# Patient Record
Sex: MALE | Race: OTHER | HISPANIC OR LATINO | URBAN - METROPOLITAN AREA
[De-identification: names, ages, dates, MRNs, and addresses within clinical notes are randomized per-mention and may not be internally consistent; named-entity substitution may affect disease eponyms.]

---

## 2019-02-26 ENCOUNTER — EMERGENCY (EMERGENCY)
Facility: HOSPITAL | Age: 59
LOS: 1 days | Discharge: HOME | End: 2019-02-26
Attending: STUDENT IN AN ORGANIZED HEALTH CARE EDUCATION/TRAINING PROGRAM
Payer: COMMERCIAL

## 2019-02-26 VITALS
WEIGHT: 179.9 LBS | HEIGHT: 65 IN | OXYGEN SATURATION: 99 % | DIASTOLIC BLOOD PRESSURE: 90 MMHG | HEART RATE: 88 BPM | SYSTOLIC BLOOD PRESSURE: 160 MMHG | RESPIRATION RATE: 18 BRPM | TEMPERATURE: 98 F

## 2019-02-26 PROCEDURE — 99284 EMERGENCY DEPT VISIT MOD MDM: CPT

## 2019-02-26 PROCEDURE — 70450 CT HEAD/BRAIN W/O DYE: CPT | Mod: 26

## 2019-02-26 PROCEDURE — 71045 X-RAY EXAM CHEST 1 VIEW: CPT | Mod: 26

## 2019-02-26 PROCEDURE — 70450 CT HEAD/BRAIN W/O DYE: CPT

## 2019-02-26 PROCEDURE — 71045 X-RAY EXAM CHEST 1 VIEW: CPT

## 2019-02-26 RX ORDER — IBUPROFEN 200 MG
1 TABLET ORAL
Qty: 12 | Refills: 0 | OUTPATIENT
Start: 2019-02-26 | End: 2019-02-28

## 2019-02-26 RX ORDER — METHOCARBAMOL 500 MG/1
1500 TABLET, FILM COATED ORAL ONCE
Qty: 0 | Refills: 0 | Status: COMPLETED | OUTPATIENT
Start: 2019-02-26 | End: 2019-02-26

## 2019-02-26 RX ORDER — IBUPROFEN 200 MG
600 TABLET ORAL ONCE
Qty: 0 | Refills: 0 | Status: COMPLETED | OUTPATIENT
Start: 2019-02-26 | End: 2019-02-26

## 2019-02-26 RX ORDER — METHOCARBAMOL 500 MG/1
1 TABLET, FILM COATED ORAL
Qty: 9 | Refills: 0 | OUTPATIENT
Start: 2019-02-26 | End: 2019-02-28

## 2019-02-26 RX ADMIN — Medication 600 MILLIGRAM(S): at 20:02

## 2019-02-26 RX ADMIN — Medication 600 MILLIGRAM(S): at 20:08

## 2019-02-26 RX ADMIN — METHOCARBAMOL 1500 MILLIGRAM(S): 500 TABLET, FILM COATED ORAL at 20:02

## 2019-02-26 NOTE — ED STATDOCS - PROGRESS NOTE DETAILS
57 y/o M with PMHx of CVA and HTN presents to the ED c/o left sided weakness, onset 3-4 days. Pt states, left arm numbness and unable to lift past a 90 degree angle without pain. Reports past stroke that left right side numb.

## 2019-02-26 NOTE — ED ADULT NURSE NOTE - OBJECTIVE STATEMENT
Pt states he hurt his arm while lifting a heavy object and was told to come to the ED for pain meds. He has not tried any other over the counter meds at home and has not been able to make an appt with a primary care provider.

## 2019-02-26 NOTE — ED ADULT NURSE REASSESSMENT NOTE - NS ED NURSE REASSESS COMMENT FT1
Pt. refusing blood work, states he only wants his pain medication. Pt awaiting MD consult at this time.

## 2019-02-26 NOTE — ED ADULT NURSE NOTE - CHPI ED NUR SYMPTOMS NEG
no deformity/no difficulty bearing weight/no weakness/no back pain/no fever/no tingling/no bruising/no numbness

## 2019-02-26 NOTE — ED PROVIDER NOTE - MUSCULOSKELETAL, MLM
Spine appears normal, tenderness to left scapular region and left lateral shoulder.  Remainder of LUE appears normal with no localized tenderness.  FROM all extremities x4.  5/5 strength noted to LUE, 3/5 strength to RUE (baseline as per pt secondary to past CVA)

## 2019-02-26 NOTE — ED PROVIDER NOTE - CHIEF COMPLAINT
The patient is a 58y Male complaining of weakness. The patient is a 58y Male complaining of left arm pain

## 2019-02-26 NOTE — ED PROVIDER NOTE - OBJECTIVE STATEMENT
57 y/o M, with hx of HTN, DM, HLD, and CVA with residual right sided weakness, presents to the ED c/o improving LUE pain, onset 2 days ago.  Pt states that he was lifting a heavy grocery bag.  Notes pain onset shortly after lifting.  Pain radiates from his shoulder down to his left hand.  Pain worse with movement.  Notes that pain has improved over the past few days.  Also states that he has residual right sided weakness from past CVA approximately 20 years ago.  Denies increased weakness to either side. 59 y/o M, with hx of HTN, DM, HLD, and CVA with residual right sided weakness, presents to the ED c/o improving left shoulder pain, onset 2 days ago.  Pt states that he was lifting a heavy grocery bag.  Notes pain onset shortly after lifting.  Pain radiates from his shoulder down to his left hand.  Pain worse with movement.  Notes that pain has improved over the past few days.  Also states that he has residual right sided weakness from past CVA approximately 20 years ago.  Denies increased weakness to either side.  Denies fever, chills, chest pain, SOB, cough, N/V/D, abd pain, neck pain, back pain, numbness, tingling, or HA.

## 2019-02-26 NOTE — ED ADULT TRIAGE NOTE - CHIEF COMPLAINT QUOTE
Patient arrived to ED today with c/o warmth and weakness to the left side of his body for the past 3-4 days.  Patient has right sided weakness after previous stroke and expressive aphagia.

## 2019-02-26 NOTE — ED PROVIDER NOTE - PMH
CVA (cerebral infarction)  R sided weakness  DM (diabetes mellitus)    HLD (hyperlipidemia)    HTN (hypertension)    Memory impairment  since his CVA

## 2020-10-30 NOTE — ED PROVIDER NOTE - DISCHARGE DATE
Called [employer] and notified her I already faxed the form over to them.  Thank you.  JOE Patton     
Calling again to check on status of forms. Please return call  
[facility] phone call message- general phone call:    Reason for call: they fax over a request for wound supplies that need to be signed by a doctor.    Action desired: just a fyi    Return call needed: Yes    OK to leave a message on voice mail? Yes    Advised patient to response may take up to 2 business days: Yes    Primary language: English      needed? No    Call taken on October 28, 2020 at 12:39 PM by Latricia Mercado  
Dr. Mack, this is just FYI for us.  Thank you.  JOE Patton    
26-Feb-2019

## 2021-10-30 ENCOUNTER — EMERGENCY (EMERGENCY)
Facility: HOSPITAL | Age: 61
LOS: 1 days | Discharge: HOME | End: 2021-10-30
Attending: EMERGENCY MEDICINE
Payer: COMMERCIAL

## 2021-10-30 VITALS
RESPIRATION RATE: 18 BRPM | OXYGEN SATURATION: 95 % | WEIGHT: 179.9 LBS | DIASTOLIC BLOOD PRESSURE: 89 MMHG | SYSTOLIC BLOOD PRESSURE: 155 MMHG | TEMPERATURE: 99 F | HEART RATE: 84 BPM | HEIGHT: 65 IN

## 2021-10-30 PROBLEM — E78.5 HYPERLIPIDEMIA, UNSPECIFIED: Chronic | Status: ACTIVE | Noted: 2019-02-28

## 2021-10-30 PROBLEM — E11.9 TYPE 2 DIABETES MELLITUS WITHOUT COMPLICATIONS: Chronic | Status: ACTIVE | Noted: 2019-02-28

## 2021-10-30 PROCEDURE — 99283 EMERGENCY DEPT VISIT LOW MDM: CPT

## 2021-10-30 RX ORDER — SODIUM CHLORIDE 0.65 %
1 AEROSOL, SPRAY (ML) NASAL ONCE
Refills: 0 | Status: COMPLETED | OUTPATIENT
Start: 2021-10-30 | End: 2021-10-30

## 2021-10-30 RX ORDER — SODIUM CHLORIDE 0.65 %
1 AEROSOL, SPRAY (ML) NASAL
Qty: 1 | Refills: 0
Start: 2021-10-30 | End: 2021-11-12

## 2021-10-30 RX ADMIN — Medication 1 SPRAY(S): at 14:22

## 2021-10-30 NOTE — ED STATDOCS - CLINICAL SUMMARY MEDICAL DECISION MAKING FREE TEXT BOX
AVSS, PE unremarkable; ENT follow up recommended for reassessment. Patient is aware of signs/symptoms to return to the emergency department.

## 2021-10-30 NOTE — ED STATDOCS - OBJECTIVE STATEMENT
61 M HTN HLD HM and CVA with R sided weakness presents to ed c/o 3 days of intermittent L sided epistaxis. No bleeding since last night. Denies trauma. Is not sure if he takes blood thinners. 61 year old M HTN HLD HM and CVA with R sided weakness presents to ed c/o 3 days of intermittent L sided epistaxis. No bleeding since last night. Denies trauma. Is not sure if he takes blood thinners. 61 year old M HTN HLD HM and CVA with R sided weakness presents to ed c/o 3 days of intermittent L sided epistaxis. No bleeding since last night. Denies trauma. Is not sure if he takes blood thinners. Denies any other complaints including ha, dizz, nv.

## 2021-10-30 NOTE — ED STATDOCS - CARE PROVIDER_API CALL
Michela Ceron)  Otolaryngology  [address] [place] [address], [place] [address]  Phone: [phone]  Fax: [phone]  Follow Up Time: Routine

## 2021-10-30 NOTE — ED STATDOCS - NSICDXPASTMEDICALHX_GEN_ALL_CORE_FT
PAST MEDICAL HISTORY:  CVA (cerebral infarction) R sided weakness    DM (diabetes mellitus)     HLD (hyperlipidemia)     HTN (hypertension)     Memory impairment since his CVA

## 2021-10-30 NOTE — ED ADULT NURSE NOTE - OBJECTIVE STATEMENT
Patient A&O x 3, presenting to the ED complaints of epistaxis x 3 days. Patient denied pain, headache, dizziness, or trauma to the nose. No distress observed, gait steady.

## 2021-10-30 NOTE — ED STATDOCS - MUSCULOSKELETAL, MLM
* No procedures listed *. CSE Anesthesia Post Evaluation Multimodal analgesia: multimodal analgesia used between 6 hours prior to anesthesia start to PACU discharge Patient location during evaluation: bedside Patient participation: complete - patient participated Level of consciousness: awake and alert Pain score: 3 Pain management: adequate Airway patency: patent Anesthetic complications: no 
Cardiovascular status: acceptable and hemodynamically stable Respiratory status: acceptable Hydration status: acceptable Post anesthesia nausea and vomiting:  none Vitals Value Taken Time BP 97/58 3/27/2019  3:46 PM  
Temp Pulse 64 3/27/2019  3:46 PM  
Resp SpO2 Vitals shown include unvalidated device data.
range of motion is not limited and there is no muscle tenderness.

## 2021-10-30 NOTE — ED STATDOCS - PROGRESS NOTE DETAILS
Taught pt proper way to stop nose bleeding.  Has f/u with PCP on 11/6  Given ENT referral if problem becomes recurrent  Given nasal saline spray with instructions

## 2021-10-30 NOTE — ED STATDOCS - PATIENT PORTAL LINK FT
You can access the FollowMyHealth Patient Portal offered by [facility] by registering at the following website: [email or web address]. By joining [employer]’s FollowMyHealth portal, you will also be able to view your health information using other applications (apps) compatible with our system.

## 2022-10-08 ENCOUNTER — EMERGENCY (EMERGENCY)
Facility: HOSPITAL | Age: 62
LOS: 1 days | Discharge: HOME | End: 2022-10-08
Attending: STUDENT IN AN ORGANIZED HEALTH CARE EDUCATION/TRAINING PROGRAM
Payer: COMMERCIAL

## 2022-10-08 VITALS
WEIGHT: 210.1 LBS | DIASTOLIC BLOOD PRESSURE: 85 MMHG | TEMPERATURE: 98 F | RESPIRATION RATE: 18 BRPM | OXYGEN SATURATION: 96 % | SYSTOLIC BLOOD PRESSURE: 176 MMHG | HEIGHT: 65 IN | HEART RATE: 61 BPM

## 2022-10-08 PROCEDURE — 99283 EMERGENCY DEPT VISIT LOW MDM: CPT

## 2022-10-08 RX ADMIN — Medication 1 APPLICATION(S): at 10:33

## 2022-10-08 NOTE — ED PROVIDER NOTE - CLINICAL SUMMARY MEDICAL DECISION MAKING FREE TEXT BOX
61yo M presenting with left hand rash x 2 weeks. dermatitis possible early psoriasis, will tx with topical steroids, recommend outpt derm f/u

## 2022-10-08 NOTE — ED PROVIDER NOTE - CARE PROVIDER_API CALL
Kortney Belcher)  Dermatology  [address], [address]  [place] [address]  Phone: [phone]  Fax: [phone]  Follow Up Time:

## 2022-10-08 NOTE — ED PROVIDER NOTE - ATTENDING APP SHARED VISIT CONTRIBUTION OF CARE
61yo M pmhx HTN, HLD, DM and CVA with residual R sided weakness presents to ED c/o pruritic rash to left hand x 2 weeks. States skin to dorsum of left hand over 2nd and 3rd knuckles has some scaling and gets very itchy. Not using any medications for symptoms. Denies new recent exposure, gardening, bites/scratches, joint swelling, new soaps/lotions.  AP - pruritic rash, no sign of infection. will treat supportively and outpt derm f/u

## 2022-10-08 NOTE — ED PROVIDER NOTE - NSFOLLOWUPINSTRUCTIONS_ED_ALL_ED_FT
- Return to the ED for any new or worsening symptoms.   - Apply thin layer triamcinolone to area 2x/day for 14 days  - Follow-up with dermatologist for further evaluation of rash    Rash    A rash is a change in the color of the skin. A rash can also change the way your skin feels. There are many different conditions and factors that can cause a rash, most of which are not dangerous. Make sure to follow up with your primary care physician or a dermatologist as instructed by your health care provider.    SEEK IMMEDIATE MEDICAL CARE IF YOU HAVE ANY OF THE FOLLOWING SYMPTOMS: fever, blisters, a rash inside your mouth, vaginal or anal pain, or altered mental status.     - Regrese al servicio de urgencias por cualquier síntoma nuevo o que empeore.  - Aplique renee capa delgada de triamcinolona en el área 2 veces al día lalo 14 días  - Seguimiento con un dermatólogo para renee evaluación adicional de la erupción    Sarpullido    Renee erupción es un cambio en el color de la piel. Renee erupción también puede cambiar la forma en que se siente healy piel. Hay muchas condiciones y factores diferentes que pueden causar renee erupción, la mayoría de los cuales no son peligrosos. Asegúrese de hacer un seguimiento con healy médico de atención primaria o un dermatólogo según las instrucciones de healy proveedor de atención médica.    BUSQUE ATENCIÓN MÉDICA INMEDIATA SI TIENE ALGUNO DE LOS SIGUIENTES SÍNTOMAS: fiebre, ampollas, sarpullido dentro de la boca, dolor vaginal o anal, o alteración del estado mental.

## 2022-10-08 NOTE — ED PROVIDER NOTE - PHYSICAL EXAMINATION
Gen: No acute distress, non toxic  HENT: NCAT, Mucous membranes moist, Oropharynx without exudates, uvula midline  Eyes: pink conjunctivae, EOMI, PERRL  Neuro: A&O x 3, residual R sided weakness 2/2 cva  MSK: No spine or joint tenderness to palpation, Full ROM all digits L hand  Skin: hyperkeratotic patches to dorsum of L hand worse over 2nd and 3rd mcp. no erythema or warmth

## 2022-10-08 NOTE — ED PROVIDER NOTE - OBJECTIVE STATEMENT
63yo M pmhx HTN, HLD, DM and CVA with residual R sided weakness presents to ED c/o pruritic rash to left hand x 2 weeks. States skin to dorsum of left hand over 2nd and 3rd knuckles has some scaling and gets very itchy. 63yo M pmhx HTN, HLD, DM and CVA with residual R sided weakness presents to ED c/o pruritic rash to left hand x 2 weeks. States skin to dorsum of left hand over 2nd and 3rd knuckles has some scaling and gets very itchy. Not using any medications for symptoms. Denies recent exposure, erythema, bites/scratches, joint swelling, trauma, new soaps/lotions.

## 2022-10-08 NOTE — ED PROVIDER NOTE - NS ED ROS FT
Gen: denies fever, chills, fatigue, weight loss  Skin: +RASH. Denies laceration, bruising  HEENT: denies visual changes, ear pain, nasal congestion, throat pain  Respiratory: denies BECERRA, SOB, cough, wheezing  Cardiovascular: denies chest pain, palpitations, diaphoresis, LE edema  MSK: denies joint swelling/pain, back pain, neck pain  Neuro: denies headache, dizziness, weakness, numbness

## 2022-10-08 NOTE — ED PROVIDER NOTE - PATIENT PORTAL LINK FT
You can access the FollowMyHealth Patient Portal offered by [facility] by registering at the following website: [email or web address]. By joining [employer]’s FollowMyHealth portal, you will also be able to view your health information using other applications (apps) compatible with our system.

## 2023-05-05 NOTE — ED ADULT NURSE NOTE - CAS ELECT INFOMATION PROVIDED
DC instructions

## 2023-06-09 ENCOUNTER — EMERGENCY (EMERGENCY)
Facility: HOSPITAL | Age: 63
LOS: 1 days | Discharge: HOME | End: 2023-06-09
Attending: EMERGENCY MEDICINE
Payer: COMMERCIAL

## 2023-06-09 VITALS
OXYGEN SATURATION: 96 % | SYSTOLIC BLOOD PRESSURE: 176 MMHG | RESPIRATION RATE: 16 BRPM | HEART RATE: 86 BPM | DIASTOLIC BLOOD PRESSURE: 95 MMHG | TEMPERATURE: 98 F

## 2023-06-09 PROCEDURE — 99283 EMERGENCY DEPT VISIT LOW MDM: CPT
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
## 2023-06-10 PROCEDURE — T1013: CPT

## 2023-06-10 PROCEDURE — 99282 EMERGENCY DEPT VISIT SF MDM: CPT

## 2023-06-10 NOTE — ED PROVIDER NOTE - CLINICAL SUMMARY MEDICAL DECISION MAKING FREE TEXT BOX
63yo M p/w epistaxis. bleeding controlled before arrival to ED. on initial eval, non-toxic appearing male found sitting in chair in no acute distress, dry blood noted in nares, otherwise remainder of PE WNL. discussed supportive care measures and return precautions. advised to f/u with PCP. discussed importance of BP control. pt verbalized understanding and agreement
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
## 2023-06-10 NOTE — ED PROVIDER NOTE - PATIENT PORTAL LINK FT
You can access the FollowMyHealth Patient Portal offered by [facility] by registering at the following website: [email or web address]. By joining [employer]’s FollowMyHealth portal, you will also be able to view your health information using other applications (apps) compatible with our system.

## 2023-06-10 NOTE — ED PROVIDER NOTE - PRINCIPAL DIAGNOSIS
Patient arrives with c/o abdominal pain that woke her up this morning at 5am. The pain is mostly upper and right side, rate 8/10 constant pain. She denies diarrhea but did vomit a few times. She reports this same pain was present a few days ago but then resolved after a few hours.    Bleeding nose

## 2023-06-10 NOTE — ED PROVIDER NOTE - PHYSICAL EXAMINATION
General: non-toxic appearing, in no acute distress, A+Ox3  HENT: normocephalic. Nasal mucosa non-inflamed, septum and turbinates normal. dry blood noted in nares. Mucous membranes moist, no gingival inflammation.   CV: RRR, nl s1/s2.  Resp: CTAB, normal rate and effort

## 2023-10-04 ENCOUNTER — OFFICE (OUTPATIENT)
Dept: URBAN - METROPOLITAN AREA CLINIC 94 | Facility: CLINIC | Age: 63
Setting detail: OPHTHALMOLOGY
End: 2023-10-04
Payer: COMMERCIAL

## 2023-10-04 DIAGNOSIS — H25.13: ICD-10-CM

## 2023-10-04 DIAGNOSIS — I63.50: ICD-10-CM

## 2023-10-04 PROCEDURE — 92250 FUNDUS PHOTOGRAPHY W/I&R: CPT | Performed by: OPHTHALMOLOGY

## 2023-10-04 PROCEDURE — 92083 EXTENDED VISUAL FIELD XM: CPT | Performed by: OPHTHALMOLOGY

## 2023-10-04 PROCEDURE — 92002 INTRM OPH EXAM NEW PATIENT: CPT | Performed by: OPHTHALMOLOGY

## 2023-10-04 ASSESSMENT — AXIALLENGTH_DERIVED
OS_AL: 22.3689
OD_AL: 22.5454

## 2023-10-04 ASSESSMENT — REFRACTION_AUTOREFRACTION
OD_CYLINDER: 0.00
OS_CYLINDER: -1.00
OD_AXIS: 000
OD_SPHERE: +1.75
OS_SPHERE: +2.75
OS_AXIS: 082

## 2023-10-04 ASSESSMENT — TONOMETRY
OS_IOP_MMHG: 16
OD_IOP_MMHG: 11

## 2023-10-04 ASSESSMENT — CONFRONTATIONAL VISUAL FIELD TEST (CVF)
OD_FINDINGS: FULL
OS_FINDINGS: FULL

## 2023-10-04 ASSESSMENT — KERATOMETRY
OS_K1POWER_DIOPTERS: 44.25
OS_K2POWER_DIOPTERS: 45.00
OD_K1POWER_DIOPTERS: 44.25
OD_AXISANGLE_DEGREES: 079
METHOD_AUTO_MANUAL: AUTO
OS_AXISANGLE_DEGREES: 083
OD_K2POWER_DIOPTERS: 45.00

## 2023-10-04 ASSESSMENT — SPHEQUIV_DERIVED
OS_SPHEQUIV: 2.25
OD_SPHEQUIV: 1.75

## 2023-10-04 ASSESSMENT — VISUAL ACUITY
OS_BCVA: 20/30-1
OD_BCVA: 20/30+1

## 2023-11-01 ENCOUNTER — APPOINTMENT (OUTPATIENT)
Dept: DERMATOLOGY | Facility: CLINIC | Age: 63
End: 2023-11-01

## 2024-01-12 ENCOUNTER — EMERGENCY (EMERGENCY)
Facility: HOSPITAL | Age: 64
LOS: 1 days | Discharge: HOME | End: 2024-01-12
Attending: EMERGENCY MEDICINE
Payer: COMMERCIAL

## 2024-01-12 VITALS
TEMPERATURE: 98 F | HEIGHT: 65 IN | WEIGHT: 179.9 LBS | RESPIRATION RATE: 18 BRPM | HEART RATE: 85 BPM | OXYGEN SATURATION: 97 % | DIASTOLIC BLOOD PRESSURE: 95 MMHG | SYSTOLIC BLOOD PRESSURE: 157 MMHG

## 2024-01-12 PROCEDURE — 99284 EMERGENCY DEPT VISIT MOD MDM: CPT

## 2024-01-12 PROCEDURE — 99283 EMERGENCY DEPT VISIT LOW MDM: CPT

## 2024-01-12 PROCEDURE — T1013: CPT

## 2024-01-12 RX ORDER — ACETAMINOPHEN 500 MG
975 TABLET ORAL ONCE
Refills: 0 | Status: COMPLETED | OUTPATIENT
Start: 2024-01-12 | End: 2024-01-12

## 2024-01-12 RX ORDER — GABAPENTIN 400 MG/1
300 CAPSULE ORAL ONCE
Refills: 0 | Status: COMPLETED | OUTPATIENT
Start: 2024-01-12 | End: 2024-01-12

## 2024-01-12 RX ORDER — GABAPENTIN 400 MG/1
1 CAPSULE ORAL
Qty: 15 | Refills: 0
Start: 2024-01-12 | End: 2024-01-16

## 2024-01-12 RX ADMIN — Medication 975 MILLIGRAM(S): at 16:31

## 2024-01-12 RX ADMIN — GABAPENTIN 300 MILLIGRAM(S): 400 CAPSULE ORAL at 16:30

## 2024-01-12 NOTE — ED PROVIDER NOTE - NSFOLLOWUPINSTRUCTIONS_ED_ALL_ED_FT
- Return to the ED for any new or worsening symptoms.   - Follow-up with podiatry for further evaluation if symptoms persist       - Regrese al servicio de urgencias ante cualquier síntoma nuevo o que empeore.  - Seguimiento con podólogo para evaluación adicional si los síntomas persisten.

## 2024-01-12 NOTE — ED PROVIDER NOTE - OBJECTIVE STATEMENT
62yo M  PMHx HTN, HLD, DM and CVA with residual R sided weakness presents to ED c/o atraumatic right leg pain onset this morning. Reporting pain to right foot that radiates proximally to right hip. Did not take pain medication prior to arrival. States pain is worse with movement and walking, feels pain when he steps on right foot. Denies fever, chills, calf pain/swelling, numbness, tingling, increased weakness, cp, sob, n/v/d, headache, dizziness, fall, trauma, bowel/bladder incontinence, back pain.   : Mayo Stanton

## 2024-01-12 NOTE — ED PROVIDER NOTE - CLINICAL SUMMARY MEDICAL DECISION MAKING FREE TEXT BOX
64yo M presenting with atraumatic RLE pain x 1 day, most prevalent to right foot. Exam unremarkable, no focal weakness. Neurovascularly intact. Range of motion at baseline. No fall/trauma. Suspect symptoms related to neuropathy, given dose of gabapentin and tylenol. advised to f/u with podiatry for further evaluation of symptoms. return precautions discussed 64yo M presenting with atraumatic RLE pain x 1 day, most prevalent to right foot. Exam unremarkable, no focal weakness. Neurovascularly intact. Range of motion at baseline. No fall/trauma. Suspect symptoms related to neuropathy, given dose of gabapentin and tylenol. pt feeling much better after medications, ambulating in ED without pain. advised to f/u with podiatry for further evaluation of symptoms. return precautions discussed

## 2024-01-12 NOTE — ED PROVIDER NOTE - ATTENDING APP SHARED VISIT CONTRIBUTION OF CARE
62yo M  PMHx HTN, HLD, DM and CVA with residual R sided weakness presents to ED c/o atraumatic right leg pain onset this morning. Reporting pain to right foot that radiates proximally to right hip. Did not take pain medication prior to arrival. States pain is worse with movement and walking, feels pain when he steps on right foot. Denies fever, chills, calf pain/swelling, numbness, tingling, increased weakness, cp, sob, n/v/d, headache, dizziness, fall, trauma, bowel/bladder incontinence, back pain.     On examination patient with tenderness noted to the toes but no signs of vascular insufficiency, C 2 seconds strong DP pulses.  Range of motion of all lower extremity joints without reduction of pain.  No signs of infection.  Will treat with gabapentin given patient's diabetes history, lotions to moisturize feet and toes and outpatient podiatry follow-up. 64yo M  PMHx HTN, HLD, DM and CVA with residual R sided weakness presents to ED c/o atraumatic right leg pain onset this morning. Reporting pain to right foot that radiates proximally to right hip. Did not take pain medication prior to arrival. States pain is worse with movement and walking, feels pain when he steps on right foot. Denies fever, chills, calf pain/swelling, numbness, tingling, increased weakness, cp, sob, n/v/d, headache, dizziness, fall, trauma, bowel/bladder incontinence, back pain.     On examination patient with tenderness noted to the toes but no signs of vascular insufficiency, C 2 seconds strong DP pulses.  Range of motion of all lower extremity joints without reduction of pain.  No signs of infection.  Will treat with gabapentin given patient's diabetes history, lotions to moisturize feet and toes and outpatient podiatry follow-up.

## 2024-01-12 NOTE — ED ADULT TRIAGE NOTE - CHIEF COMPLAINT QUOTE
pt c/o right leg pain when trying to get out of bed this morning. pt has h/o stroke in 2013 with residual right arm weakness, slurred speech and right facial droop.

## 2024-01-12 NOTE — ED PROVIDER NOTE - CARE PROVIDER_API CALL
Parminder Sanchez  Podiatric Medicine and Surgery  [address] [place] [id number]  Phone: [phone]  Fax: [phone]  Follow Up Time:    Parminder Sanchez  Podiatric Medicine and Surgery  [address] [place] [id number]  Phone: [phone]  Fax: [phone]  Follow Up Time:

## 2024-01-12 NOTE — ED PROVIDER NOTE - CARE PROVIDERS DIRECT ADDRESSES
[email or web address]

## 2024-01-12 NOTE — ED PROVIDER NOTE - PHYSICAL EXAMINATION
Gen: No acute distress, non toxic  Head: NCAT  Eyes: pink conjunctivae, EOMI, PERRL  CV: RRR, nl s1/s2.  Resp: CTAB, normal rate and effort  Neuro: A&O x 3, chronic R facial droop and RUE hemiparesis. sensorimotor intact RLE.   MSK: Full ROM LLE, ROM baseline RLE, able to perform hip flexion without pain. +TTP along toes and plantar aspect of foot.   Vasc: DP pulses intact, symmetric b/l  Skin: No rashes. intact and perfused. Dry skin to R foot

## 2024-01-12 NOTE — ED PROVIDER NOTE - PATIENT PORTAL LINK FT
You can access the FollowMyHealth Patient Portal offered by [facility] by registering at the following website: [email or web address]. By joining [email or web address]’s FollowMyHealth portal, you will also be able to view your health information using other applications (apps) compatible with our system. You can access the FollowMyHealth Patient Portal offered by [facility] by registering at the following website: [email or web address]. By joining [employer]’s FollowMyHealth portal, you will also be able to view your health information using other applications (apps) compatible with our system.

## 2024-01-23 NOTE — ED PROVIDER NOTE - MDM ORDERS SUBMITTED SELECTION
[Nutrition/ Exercise/ Weight Management] : nutrition, exercise, weight management [Vitamins/Supplements] : vitamins/supplements [Breast Self Exam] : breast self exam Medications

## 2024-06-19 NOTE — ED PROVIDER NOTE - CARDIAC, MLM
What Type Of Note Output Would You Prefer (Optional)?: Standard Output
How Severe Are Your Spot(S)?: mild
Have Your Spot(S) Been Treated In The Past?: has not been treated
Hpi Title: Evaluation of Skin Lesions
Normal rate, regular rhythm.  Heart sounds S1, S2.  No murmurs

## 2024-10-23 ENCOUNTER — EMERGENCY (EMERGENCY)
Facility: HOSPITAL | Age: 64
LOS: 1 days | Discharge: HOME | End: 2024-10-23
Attending: STUDENT IN AN ORGANIZED HEALTH CARE EDUCATION/TRAINING PROGRAM
Payer: COMMERCIAL

## 2024-10-23 VITALS
HEART RATE: 76 BPM | SYSTOLIC BLOOD PRESSURE: 143 MMHG | TEMPERATURE: 98 F | WEIGHT: 179.9 LBS | OXYGEN SATURATION: 98 % | HEIGHT: 65 IN | RESPIRATION RATE: 20 BRPM | DIASTOLIC BLOOD PRESSURE: 84 MMHG

## 2024-10-23 PROCEDURE — 96372 THER/PROPH/DIAG INJ SC/IM: CPT

## 2024-10-23 PROCEDURE — 99284 EMERGENCY DEPT VISIT MOD MDM: CPT

## 2024-10-23 PROCEDURE — 99283 EMERGENCY DEPT VISIT LOW MDM: CPT | Mod: 25

## 2024-10-23 RX ORDER — KETOROLAC TROMETHAMINE 10 MG/1
15 TABLET, FILM COATED ORAL ONCE
Refills: 0 | Status: DISCONTINUED | OUTPATIENT
Start: 2024-10-23 | End: 2024-10-23

## 2024-10-23 RX ORDER — CYCLOBENZAPRINE HCL 10 MG
10 TABLET ORAL ONCE
Refills: 0 | Status: COMPLETED | OUTPATIENT
Start: 2024-10-23 | End: 2024-10-23

## 2024-10-23 RX ORDER — LIDOCAINE 50 MG/G
1 CREAM TOPICAL ONCE
Refills: 0 | Status: COMPLETED | OUTPATIENT
Start: 2024-10-23 | End: 2024-10-23

## 2024-10-23 RX ORDER — CYCLOBENZAPRINE HCL 10 MG
1 TABLET ORAL
Qty: 12 | Refills: 0
Start: 2024-10-23 | End: 2024-10-26

## 2024-10-23 RX ADMIN — Medication 10 MILLIGRAM(S): at 10:51

## 2024-10-23 RX ADMIN — LIDOCAINE 1 PATCH: 50 CREAM TOPICAL at 10:51

## 2024-10-23 RX ADMIN — KETOROLAC TROMETHAMINE 15 MILLIGRAM(S): 10 TABLET, FILM COATED ORAL at 10:51

## 2024-10-23 NOTE — ED PROVIDER NOTE - NSFOLLOWUPINSTRUCTIONS_ED_ALL_ED_FT
You were seen and evaluated in the emergency department for your symptoms.  Your symptoms are likely due to a muscle spasm.  Please follow-up with your primary care doctor within 2 days to discuss your visit here today.    Take 500mg - 1000mg of Tylenol (acetaminophen) with food every 6 hours for 3 days to reduce inflammation and treat your pain.  After that, you may take as needed - please follow the directions on the packaging.  Standard regular strength Tylenol is 500mg, therefore take 1-2 pills per dose.     Please take the prescribed flexeril as needed for pain every 8 hours. Please do not drive or operate machinery while taking this medication as it can make you sleepy.     Please return to the emergency department for any new or concerning symptoms including but not limited to fever, chills, chest pain, difficulty breathing, worsening pain.

## 2024-10-23 NOTE — ED ADULT TRIAGE NOTE - CHIEF COMPLAINT QUOTE
BIBA from home c/o non traumatic   R sided body pain for three days progressively getting worse . Hx of R sided stroke with residual

## 2024-10-23 NOTE — ED PROVIDER NOTE - PHYSICAL EXAMINATION
General: non-toxic appearing, in no acute distress, A+Ox3  CV: RRR, nl s1/s2.  Resp: CTAB, normal rate and effort  : No CVAT.  Neuro: R lower ext 3/5 strength (baseline due to R sided residual weakness s/p cva)  MSK: No spine or joint tenderness to palpation. ambulatory with R sided limp  Skin: No rashes, lacerations, abrasions, ecchymosis. intact and perfused. General: non-toxic appearing, in no acute distress, A+Ox3  CV: RRR, nl s1/s2.  Resp: CTAB, normal rate and effort  : No CVAT.  Neuro: R lower ext 4/5 strength (baseline due to R sided residual weakness s/p cva), RUE held in baseline contracture.  MSK: No spine or joint tenderness to palpation. ambulatory with R sided limp  Skin: No rashes, lacerations, abrasions, ecchymosis. intact and perfused.

## 2024-10-23 NOTE — ED PROVIDER NOTE - PATIENT PORTAL LINK FT
You can access the FollowMyHealth Patient Portal offered by [facility] by registering at the following website: [email or web address]. By joining [employer]’s FollowMyHealth portal, you will also be able to view your health information using other applications (apps) compatible with our system.

## 2024-10-23 NOTE — ED PROVIDER NOTE - OBJECTIVE STATEMENT
65yo M pmh HTN, HLD, DM, CVA w residual R side deficits, presents to ED c/o upper R flank pain with radiation to R upper back and pain of R foot when takes a step x3d. pain worse with ambulating and certain positions. no relief with otc pain meds, unsure of name of meds, last took meds yesterday. denies recent trauma/injury, CP, SOB, palpitations, dyrusia, urinary freq/urgency, hematuria, abdominal pain, NV, numbness

## 2024-10-23 NOTE — ED PROVIDER NOTE - ATTENDING APP SHARED VISIT CONTRIBUTION OF CARE
I have personally performed a history and physical examination of the patient and discussed management with the RAFAEL as well as the patient.  I reviewed the RAFAEL's note and agree with the documented findings and plan of care.  I have authored and modified critical sections of the Provider Note, including but not limited to HPI, Physical Exam and MDM.    65yo M pmh HTN, HLD, DM, CVA w residual R side deficits, presents to ED c/o upper R flank pain with radiation to R upper back and pain of R foot when takes a step x3d. pain worse with ambulating and certain positions. no relief with otc pain meds, unsure of name of meds, last took meds yesterday.  Range of motion at baseline throughout, ambulating to baseline, and strength reported baseline.  No evidence of rash on examination.  No CVA tenderness to palpation.  Clinically apparent right paraspinal muscle spasm.  Possibly MSK related.  Cannot exclude diabetic foot ulcer.  Discussed this with patient and requested to examine foot by removing boot however patient refused multiple times stating boot is difficult to get back on.  Offered to re-don boot for the patient, continues to decline for examination.  Will provide symptomatic control as needed.  No current concern for acute on chronic intracranial etiology.  Disposition pending response to medications.  Likely outpatient follow-up.

## 2024-10-26 DIAGNOSIS — I69.351 HEMIPLEGIA AND HEMIPARESIS FOLLOWING CEREBRAL INFARCTION AFFECTING RIGHT DOMINANT SIDE: ICD-10-CM

## 2024-10-26 DIAGNOSIS — R10.9 UNSPECIFIED ABDOMINAL PAIN: ICD-10-CM

## 2024-10-26 DIAGNOSIS — M79.671 PAIN IN RIGHT FOOT: ICD-10-CM

## 2024-10-26 DIAGNOSIS — I10 ESSENTIAL (PRIMARY) HYPERTENSION: ICD-10-CM

## 2024-10-26 DIAGNOSIS — E11.9 TYPE 2 DIABETES MELLITUS WITHOUT COMPLICATIONS: ICD-10-CM

## 2024-12-03 NOTE — ED PROVIDER NOTE - IV ALTEPLASE INCLUSION HIDDEN
lotions for this condition.    He has a known allergy to SULFA DRUGS, which previously resulted in a severe hive outbreak when he was prescribed Bactrim at an urgent care facility a few years ago.    Supplemental Information  He has a scheduled appointment with an electrophysiologist next Monday for a routine checkup and is currently on sotalol.    ALLERGIES  The patient is allergic to SULFA DRUGS.    MEDICATIONS  Current: Lasix, Entresto, sotalol, Lipitor    Review of Systems   All other systems reviewed and are negative.         Objective   Blood pressure (!) 145/90, pulse 85, weight (!) 172.4 kg (380 lb).    Physical Exam:   Gen: alert, NAD  HEENT: AT/NC, no icterus, no oral lesions, no dental problems.   Neck: supple, no JVD, no cervical lymphadenopathy, trachea midline, no thyromegaly or thyroid nodules.  Chest: no distress, CTA, no wheezes, crackles, or rhonchi.  Heart: regular rate and rhythm, no murmurs, gallop, or rub.  ABD: soft, non-distended, no HSM/masses, non-tender, normoactive bowel sounds  EXT: no cyanosis, no clubbing, no splinter hemorrhages, no edema  NEURO: CN 2-12 intact, no focal deficits, normal gait  Derm: no rashes.  Psych: normal affect.              The patient (or guardian, if applicable) and other individuals in attendance with the patient were advised that Artificial Intelligence will be utilized during this visit to record, process the conversation to generate a clinical note and to support improvement of the AI technology. The patient (or guardian, if applicable) and other individuals in attendance at the appointment consented to the use of AI, including the recording.      An electronic signature was used to authenticate this note.    --Danie Martinez MD     
show

## 2025-04-11 PROBLEM — Z00.00 ENCOUNTER FOR PREVENTIVE HEALTH EXAMINATION: Status: ACTIVE | Noted: 2025-04-11

## 2025-06-07 ENCOUNTER — EMERGENCY (EMERGENCY)
Facility: HOSPITAL | Age: 65
LOS: 1 days | End: 2025-06-07
Attending: STUDENT IN AN ORGANIZED HEALTH CARE EDUCATION/TRAINING PROGRAM
Payer: COMMERCIAL

## 2025-06-07 VITALS
RESPIRATION RATE: 20 BRPM | OXYGEN SATURATION: 98 % | TEMPERATURE: 99 F | SYSTOLIC BLOOD PRESSURE: 173 MMHG | HEART RATE: 71 BPM | DIASTOLIC BLOOD PRESSURE: 95 MMHG

## 2025-06-07 LAB
ALBUMIN SERPL ELPH-MCNC: 3.9 G/DL — SIGNIFICANT CHANGE UP (ref 3.3–5.2)
ALP SERPL-CCNC: 82 U/L — SIGNIFICANT CHANGE UP (ref 40–120)
ALT FLD-CCNC: 70 U/L — HIGH
ANION GAP SERPL CALC-SCNC: 13 MMOL/L — SIGNIFICANT CHANGE UP (ref 5–17)
AST SERPL-CCNC: 48 U/L — HIGH
BASOPHILS # BLD AUTO: 0.02 K/UL — SIGNIFICANT CHANGE UP (ref 0–0.2)
BASOPHILS NFR BLD AUTO: 0.3 % — SIGNIFICANT CHANGE UP (ref 0–2)
BILIRUB SERPL-MCNC: 0.5 MG/DL — SIGNIFICANT CHANGE UP (ref 0.4–2)
BUN SERPL-MCNC: 10.2 MG/DL — SIGNIFICANT CHANGE UP (ref 8–20)
CALCIUM SERPL-MCNC: 8.6 MG/DL — SIGNIFICANT CHANGE UP (ref 8.4–10.5)
CHLORIDE SERPL-SCNC: 96 MMOL/L — SIGNIFICANT CHANGE UP (ref 96–108)
CO2 SERPL-SCNC: 25 MMOL/L — SIGNIFICANT CHANGE UP (ref 22–29)
CREAT SERPL-MCNC: 0.63 MG/DL — SIGNIFICANT CHANGE UP (ref 0.5–1.3)
EGFR: 106 ML/MIN/1.73M2 — SIGNIFICANT CHANGE UP
EGFR: 106 ML/MIN/1.73M2 — SIGNIFICANT CHANGE UP
EOSINOPHIL # BLD AUTO: 0.08 K/UL — SIGNIFICANT CHANGE UP (ref 0–0.5)
EOSINOPHIL NFR BLD AUTO: 1.4 % — SIGNIFICANT CHANGE UP (ref 0–6)
GLUCOSE SERPL-MCNC: 89 MG/DL — SIGNIFICANT CHANGE UP (ref 70–99)
HCT VFR BLD CALC: 41.5 % — SIGNIFICANT CHANGE UP (ref 39–50)
HGB BLD-MCNC: 13.8 G/DL — SIGNIFICANT CHANGE UP (ref 13–17)
IMM GRANULOCYTES # BLD AUTO: 0.02 K/UL — SIGNIFICANT CHANGE UP (ref 0–0.07)
IMM GRANULOCYTES NFR BLD AUTO: 0.3 % — SIGNIFICANT CHANGE UP (ref 0–0.9)
LIDOCAIN IGE QN: 54 U/L — HIGH (ref 22–51)
LYMPHOCYTES # BLD AUTO: 1.98 K/UL — SIGNIFICANT CHANGE UP (ref 1–3.3)
LYMPHOCYTES NFR BLD AUTO: 33.5 % — SIGNIFICANT CHANGE UP (ref 13–44)
MCHC RBC-ENTMCNC: 29.1 PG — SIGNIFICANT CHANGE UP (ref 27–34)
MCHC RBC-ENTMCNC: 33.3 G/DL — SIGNIFICANT CHANGE UP (ref 32–36)
MCV RBC AUTO: 87.6 FL — SIGNIFICANT CHANGE UP (ref 80–100)
MONOCYTES # BLD AUTO: 0.65 K/UL — SIGNIFICANT CHANGE UP (ref 0–0.9)
MONOCYTES NFR BLD AUTO: 11 % — SIGNIFICANT CHANGE UP (ref 2–14)
NEUTROPHILS # BLD AUTO: 3.16 K/UL — SIGNIFICANT CHANGE UP (ref 1.8–7.4)
NEUTROPHILS NFR BLD AUTO: 53.5 % — SIGNIFICANT CHANGE UP (ref 43–77)
NRBC # BLD AUTO: 0 K/UL — SIGNIFICANT CHANGE UP (ref 0–0)
NRBC # FLD: 0 K/UL — SIGNIFICANT CHANGE UP (ref 0–0)
NRBC BLD AUTO-RTO: 0 /100 WBCS — SIGNIFICANT CHANGE UP (ref 0–0)
PLATELET # BLD AUTO: 250 K/UL — SIGNIFICANT CHANGE UP (ref 150–400)
PMV BLD: 11 FL — SIGNIFICANT CHANGE UP (ref 7–13)
POTASSIUM SERPL-MCNC: 4.6 MMOL/L — SIGNIFICANT CHANGE UP (ref 3.5–5.3)
POTASSIUM SERPL-SCNC: 4.6 MMOL/L — SIGNIFICANT CHANGE UP (ref 3.5–5.3)
PROT SERPL-MCNC: 8.2 G/DL — SIGNIFICANT CHANGE UP (ref 6.6–8.7)
RBC # BLD: 4.74 M/UL — SIGNIFICANT CHANGE UP (ref 4.2–5.8)
RBC # FLD: 13.5 % — SIGNIFICANT CHANGE UP (ref 10.3–14.5)
SODIUM SERPL-SCNC: 134 MMOL/L — LOW (ref 135–145)
WBC # BLD: 5.91 K/UL — SIGNIFICANT CHANGE UP (ref 3.8–10.5)
WBC # FLD AUTO: 5.91 K/UL — SIGNIFICANT CHANGE UP (ref 3.8–10.5)

## 2025-06-07 PROCEDURE — 99284 EMERGENCY DEPT VISIT MOD MDM: CPT

## 2025-06-07 RX ORDER — ACETAMINOPHEN 500 MG/5ML
1000 LIQUID (ML) ORAL ONCE
Refills: 0 | Status: COMPLETED | OUTPATIENT
Start: 2025-06-07 | End: 2025-06-07

## 2025-06-08 VITALS
DIASTOLIC BLOOD PRESSURE: 96 MMHG | HEART RATE: 66 BPM | RESPIRATION RATE: 17 BRPM | TEMPERATURE: 97 F | SYSTOLIC BLOOD PRESSURE: 167 MMHG | OXYGEN SATURATION: 99 %

## 2025-06-08 PROCEDURE — 76705 ECHO EXAM OF ABDOMEN: CPT

## 2025-06-08 PROCEDURE — 80053 COMPREHEN METABOLIC PANEL: CPT

## 2025-06-08 PROCEDURE — 85025 COMPLETE CBC W/AUTO DIFF WBC: CPT

## 2025-06-08 PROCEDURE — 83690 ASSAY OF LIPASE: CPT

## 2025-06-08 PROCEDURE — 76705 ECHO EXAM OF ABDOMEN: CPT | Mod: 26

## 2025-06-08 PROCEDURE — 36415 COLL VENOUS BLD VENIPUNCTURE: CPT

## 2025-06-08 PROCEDURE — 99284 EMERGENCY DEPT VISIT MOD MDM: CPT

## 2025-07-30 ENCOUNTER — APPOINTMENT (OUTPATIENT)
Dept: ORTHOPEDIC SURGERY | Facility: CLINIC | Age: 65
End: 2025-07-30
Payer: MEDICAID

## 2025-07-30 DIAGNOSIS — M21.371 FOOT DROP, RIGHT FOOT: ICD-10-CM

## 2025-07-30 PROCEDURE — 99203 OFFICE O/P NEW LOW 30 MIN: CPT

## 2025-08-26 ENCOUNTER — APPOINTMENT (OUTPATIENT)
Dept: UROLOGY | Facility: CLINIC | Age: 65
End: 2025-08-26

## 2025-09-15 ENCOUNTER — APPOINTMENT (OUTPATIENT)
Dept: UROLOGY | Facility: CLINIC | Age: 65
End: 2025-09-15
Payer: MEDICAID

## 2025-09-15 VITALS
BODY MASS INDEX: 28.99 KG/M2 | DIASTOLIC BLOOD PRESSURE: 79 MMHG | WEIGHT: 174 LBS | SYSTOLIC BLOOD PRESSURE: 174 MMHG | HEART RATE: 78 BPM | HEIGHT: 65 IN

## 2025-09-15 DIAGNOSIS — N52.9 MALE ERECTILE DYSFUNCTION, UNSPECIFIED: ICD-10-CM

## 2025-09-15 PROCEDURE — 99204 OFFICE O/P NEW MOD 45 MIN: CPT

## 2025-09-15 RX ORDER — TADALAFIL 20 MG/1
20 TABLET ORAL
Qty: 10 | Refills: 0 | Status: ACTIVE | COMMUNITY
Start: 2025-09-15 | End: 1900-01-01

## 2025-09-17 ENCOUNTER — NON-APPOINTMENT (OUTPATIENT)
Age: 65
End: 2025-09-17